# Patient Record
Sex: MALE | Race: WHITE | URBAN - METROPOLITAN AREA
[De-identification: names, ages, dates, MRNs, and addresses within clinical notes are randomized per-mention and may not be internally consistent; named-entity substitution may affect disease eponyms.]

---

## 2017-05-09 ENCOUNTER — HOSPITAL ENCOUNTER (OUTPATIENT)
Dept: SURGERY | Age: 72
Discharge: HOME | End: 2017-05-09
Attending: INTERNAL MEDICINE | Admitting: INTERNAL MEDICINE

## 2017-05-09 VITALS
HEART RATE: 68 BPM | HEIGHT: 66 IN | SYSTOLIC BLOOD PRESSURE: 110 MMHG | BODY MASS INDEX: 25.07 KG/M2 | DIASTOLIC BLOOD PRESSURE: 68 MMHG | OXYGEN SATURATION: 95 % | TEMPERATURE: 97.2 F | WEIGHT: 156 LBS | RESPIRATION RATE: 16 BRPM

## 2017-05-09 DIAGNOSIS — D50.9 IRON DEFICIENCY ANEMIA: ICD-10-CM

## 2017-05-09 RX ORDER — LIDOCAINE HYDROCHLORIDE 10 MG/ML
0.1 INJECTION, SOLUTION EPIDURAL; INFILTRATION; INTRACAUDAL; PERINEURAL ONCE
Status: DISCONTINUED | OUTPATIENT
Start: 2017-05-09 | End: 2017-05-10 | Stop reason: HOSPADM

## 2017-05-09 RX ORDER — SODIUM CHLORIDE, SODIUM LACTATE, POTASSIUM CHLORIDE, CALCIUM CHLORIDE 600; 310; 30; 20 MG/100ML; MG/100ML; MG/100ML; MG/100ML
INJECTION, SOLUTION INTRAVENOUS CONTINUOUS
Status: DISCONTINUED | OUTPATIENT
Start: 2017-05-09 | End: 2017-05-10 | Stop reason: HOSPADM

## 2017-05-09 RX ADMIN — SODIUM CHLORIDE, SODIUM LACTATE, POTASSIUM CHLORIDE, CALCIUM CHLORIDE: 600; 310; 30; 20 INJECTION, SOLUTION INTRAVENOUS at 11:49

## 2017-05-09 ASSESSMENT — PAIN SCALES - GENERAL
PAINLEVEL_OUTOF10: 0

## 2017-05-09 ASSESSMENT — PAIN - FUNCTIONAL ASSESSMENT: PAIN_FUNCTIONAL_ASSESSMENT: 0-10

## 2022-06-29 LAB
FERRITIN: 20.6 NG/ML (ref 30–400)
FOLATE: 9.72 NG/ML (ref 4.78–24.2)
IRON SATURATION: 26 % (ref 20–50)
IRON: 87 UG/DL (ref 59–158)
TOTAL IRON BINDING CAPACITY: 339 UG/DL (ref 260–445)
TRANSFERRIN: 287 MG/DL (ref 200–360)

## 2022-12-01 RX ORDER — PANTOPRAZOLE SODIUM 40 MG/1
40 GRANULE, DELAYED RELEASE ORAL
COMMUNITY

## 2022-12-01 RX ORDER — AMLODIPINE BESYLATE 10 MG/1
10 TABLET ORAL DAILY
COMMUNITY

## 2022-12-01 RX ORDER — LOSARTAN POTASSIUM 100 MG/1
100 TABLET ORAL DAILY
COMMUNITY

## 2022-12-01 NOTE — PROGRESS NOTES
[facility] ENDOSCOPY COLONOSCOPY PRE-OPERATIVE INSTRUCTIONS    Procedure date__12/8/2022_______Arrival time__1130__________        Surgery time__1230__________       Clear liquids the day before the procedure. Do not eat or drink anything within 5 hours of your procedure. This includes water chewing gum, mints and ice chips. You may brush your teeth and gargle the morning of your surgery, but do not swallow the water    You may be asked to stop blood thinners such as Coumadin, Plavix, Fragmin, Lovenox, etc., or any anti-inflammatories such as:  Aspirin, Ibuprofen, Advil, Naproxen prior to your procedure. We also ask that you stop any OTC medications such as fish oil, vitamin E, glucosamine, garlic, Multivitamins, COQ 10, etc.    You must make arrangements for a responsible adult to arrive with you and stay in our waiting area during your procedure. They will also need to take you home after your procedure. For your safety you will not be allowed to leave alone or drive yourself home. Also for your safety, it is strongly suggested that someone stay with you the first 24 hours after your procedure. For your comfort, please wear simple loose fitting clothing to the center. Please do not bring valuables. If you have a living will and a durable power of  for healthcare, please bring in a copy.      You will need to bring a photo ID and insurance card    Our goal is to provide you with excellent care so if you have any questions, please contact us at the [facility] at [phone]         Please note these are generalized instructions for all colonoscopy cases, you may be provided with more specific instructions if necessary

## 2022-12-07 ENCOUNTER — ANESTHESIA EVENT (OUTPATIENT)
Dept: ENDOSCOPY | Age: 77
End: 2022-12-07
Payer: MEDICARE

## 2022-12-08 ENCOUNTER — ANESTHESIA (OUTPATIENT)
Dept: ENDOSCOPY | Age: 77
End: 2022-12-08
Payer: MEDICARE

## 2022-12-08 ENCOUNTER — HOSPITAL ENCOUNTER (OUTPATIENT)
Age: 77
Setting detail: OUTPATIENT SURGERY
Discharge: HOME | End: 2022-12-08
Attending: INTERNAL MEDICINE | Admitting: INTERNAL MEDICINE
Payer: MEDICARE

## 2022-12-08 VITALS
TEMPERATURE: 96.9 F | DIASTOLIC BLOOD PRESSURE: 77 MMHG | OXYGEN SATURATION: 99 % | BODY MASS INDEX: 24.2 KG/M2 | HEART RATE: 73 BPM | HEIGHT: 66 IN | SYSTOLIC BLOOD PRESSURE: 105 MMHG | RESPIRATION RATE: 16 BRPM | WEIGHT: 150.6 LBS

## 2022-12-08 DIAGNOSIS — K50.90 CROHN'S DISEASE WITHOUT COMPLICATION, UNSPECIFIED GASTROINTESTINAL TRACT LOCATION (HCC): ICD-10-CM

## 2022-12-08 PROCEDURE — 2500000003 HC RX 250 WO HCPCS

## 2022-12-08 PROCEDURE — 3609010600 HC COLONOSCOPY POLYPECTOMY SNARE/COLD BIOPSY: Performed by: INTERNAL MEDICINE

## 2022-12-08 PROCEDURE — 2580000003 HC RX 258: Performed by: ANESTHESIOLOGY

## 2022-12-08 PROCEDURE — 6360000002 HC RX W HCPCS

## 2022-12-08 PROCEDURE — 88305 TISSUE EXAM BY PATHOLOGIST: CPT

## 2022-12-08 PROCEDURE — 7100000010 HC PHASE II RECOVERY - FIRST 15 MIN: Performed by: INTERNAL MEDICINE

## 2022-12-08 PROCEDURE — 3700000000 HC ANESTHESIA ATTENDED CARE: Performed by: INTERNAL MEDICINE

## 2022-12-08 PROCEDURE — 7100000011 HC PHASE II RECOVERY - ADDTL 15 MIN: Performed by: INTERNAL MEDICINE

## 2022-12-08 PROCEDURE — 3700000001 HC ADD 15 MINUTES (ANESTHESIA): Performed by: INTERNAL MEDICINE

## 2022-12-08 PROCEDURE — 2709999900 HC NON-CHARGEABLE SUPPLY: Performed by: INTERNAL MEDICINE

## 2022-12-08 RX ORDER — SODIUM CHLORIDE 9 MG/ML
INJECTION, SOLUTION INTRAVENOUS PRN
Status: CANCELLED | OUTPATIENT
Start: 2022-12-08

## 2022-12-08 RX ORDER — DIPHENHYDRAMINE HYDROCHLORIDE 50 MG/ML
12.5 INJECTION INTRAMUSCULAR; INTRAVENOUS
Status: CANCELLED | OUTPATIENT
Start: 2022-12-08 | End: 2022-12-09

## 2022-12-08 RX ORDER — PROPOFOL 10 MG/ML
INJECTION, EMULSION INTRAVENOUS PRN
Status: DISCONTINUED | OUTPATIENT
Start: 2022-12-08 | End: 2022-12-08 | Stop reason: SDUPTHER

## 2022-12-08 RX ORDER — SODIUM CHLORIDE 0.9 % (FLUSH) 0.9 %
5-40 SYRINGE (ML) INJECTION EVERY 12 HOURS SCHEDULED
Status: DISCONTINUED | OUTPATIENT
Start: 2022-12-08 | End: 2022-12-08 | Stop reason: HOSPADM

## 2022-12-08 RX ORDER — SODIUM CHLORIDE 9 MG/ML
INJECTION, SOLUTION INTRAVENOUS PRN
Status: DISCONTINUED | OUTPATIENT
Start: 2022-12-08 | End: 2022-12-08 | Stop reason: HOSPADM

## 2022-12-08 RX ORDER — SODIUM CHLORIDE 0.9 % (FLUSH) 0.9 %
5-40 SYRINGE (ML) INJECTION PRN
Status: DISCONTINUED | OUTPATIENT
Start: 2022-12-08 | End: 2022-12-08 | Stop reason: HOSPADM

## 2022-12-08 RX ORDER — LIDOCAINE HYDROCHLORIDE 20 MG/ML
INJECTION, SOLUTION EPIDURAL; INFILTRATION; INTRACAUDAL; PERINEURAL PRN
Status: DISCONTINUED | OUTPATIENT
Start: 2022-12-08 | End: 2022-12-08 | Stop reason: SDUPTHER

## 2022-12-08 RX ORDER — SODIUM CHLORIDE 0.9 % (FLUSH) 0.9 %
5-40 SYRINGE (ML) INJECTION PRN
Status: CANCELLED | OUTPATIENT
Start: 2022-12-08

## 2022-12-08 RX ORDER — SODIUM CHLORIDE 0.9 % (FLUSH) 0.9 %
5-40 SYRINGE (ML) INJECTION EVERY 12 HOURS SCHEDULED
Status: CANCELLED | OUTPATIENT
Start: 2022-12-08

## 2022-12-08 RX ORDER — PROPOFOL 10 MG/ML
INJECTION, EMULSION INTRAVENOUS CONTINUOUS PRN
Status: DISCONTINUED | OUTPATIENT
Start: 2022-12-08 | End: 2022-12-08 | Stop reason: SDUPTHER

## 2022-12-08 RX ORDER — ONDANSETRON 2 MG/ML
4 INJECTION INTRAMUSCULAR; INTRAVENOUS
Status: CANCELLED | OUTPATIENT
Start: 2022-12-08 | End: 2022-12-09

## 2022-12-08 RX ADMIN — PROPOFOL 160 MCG/KG/MIN: 10 INJECTION, EMULSION INTRAVENOUS at 12:47

## 2022-12-08 RX ADMIN — SODIUM CHLORIDE: 9 INJECTION, SOLUTION INTRAVENOUS at 12:04

## 2022-12-08 RX ADMIN — PROPOFOL 80 MG: 10 INJECTION, EMULSION INTRAVENOUS at 12:47

## 2022-12-08 RX ADMIN — LIDOCAINE HYDROCHLORIDE 60 MG: 20 INJECTION, SOLUTION EPIDURAL; INFILTRATION; INTRACAUDAL; PERINEURAL at 12:47

## 2022-12-08 ASSESSMENT — PAIN SCALES - GENERAL
PAINLEVEL_OUTOF10: 0
PAINLEVEL_OUTOF10: 0

## 2022-12-08 ASSESSMENT — ENCOUNTER SYMPTOMS: SHORTNESS OF BREATH: 0

## 2022-12-08 ASSESSMENT — PAIN - FUNCTIONAL ASSESSMENT: PAIN_FUNCTIONAL_ASSESSMENT: NONE - DENIES PAIN

## 2022-12-08 ASSESSMENT — LIFESTYLE VARIABLES: SMOKING_STATUS: 0

## 2022-12-08 NOTE — DISCHARGE INSTRUCTIONS

## 2022-12-08 NOTE — ANESTHESIA PRE PROCEDURE
Department of Anesthesiology  Preprocedure Note       Name:  Morteza Cooper   Age:  77 y.o.  :  1945                                          MRN:  [id number]         Date:  2022      Surgeon: Lucinda Kim):  Thao Andrews MD    Procedure: Procedure(s):  COLONOSCOPY DIAGNOSTIC    Medications prior to admission:   Prior to Admission medications    Medication Sig Start Date End Date Taking? Authorizing Provider   losartan (COZAAR) 100 MG tablet Take 100 mg by mouth daily   Yes Historical Provider, MD   amLODIPine (NORVASC) 10 MG tablet Take 10 mg by mouth daily   Yes Historical Provider, MD   CHLORTHALIDONE PO Take 12.5 mg by mouth   Yes Historical Provider, MD   pantoprazole sodium (PROTONIX) 40 MG PACK packet Take 40 mg by mouth every morning (before breakfast)   Yes Historical Provider, MD   fluticasone (FLONASE) 50 MCG/ACT nasal spray 1 spray by Nasal route daily. Historical Provider, MD   tamsulosin (FLOMAX) 0.4 MG capsule Take 0.4 mg by mouth 2 times daily. Historical Provider, MD   finasteride (PROSCAR) 5 MG tablet Take 5 mg by mouth daily. Historical Provider, MD       Current medications:    Current Facility-Administered Medications   Medication Dose Route Frequency Provider Last Rate Last Admin    sodium chloride flush 0.9 % injection 5-40 mL  5-40 mL IntraVENous 2 times per day Cait Rowe MD        sodium chloride flush 0.9 % injection 5-40 mL  5-40 mL IntraVENous PRN Cait Rowe MD        0.9 % sodium chloride infusion   IntraVENous PRN Cait Rowe MD 50 mL/hr at 22 1204 New Bag at 22 1204       Allergies:     Allergies   Allergen Reactions    Niacin And Related        Problem List:    Patient Active Problem List   Diagnosis Code    Hydrocele N43.3    Low HDL (under 40) E78.6    Nephrolithiasis N20.0    Duodenal ulcer with hemorrhage K26.4    Hypertension I10    BPH (benign prostatic hyperplasia) N40.0    Crohn's disease ([facility] [address] K50.90    Vitamin B 12 deficiency E53.8    Skin tag L91.8       Past Medical History:        Diagnosis Date    Blood transfusion 1968    for bleeding ulcer    BPH (benign prostatic hyperplasia) 2000    Calculus of kidney 2008    Chronic or unspecified duodenal ulcer with hemorrhage, without mention of obstruction 1969    History of lithotripsy     Hydrocele     Hypertension 2000    Low HDL (under 40)     Nephrolithiasis     Regional enteritis of small intestine ([address] 1966    AKA Crohn's Disease    Vitamin D deficiency        Past Surgical History:        Procedure Laterality Date    COLONOSCOPY  03/19/1999    Stenotic ulcerated anastomosis    COLONOSCOPY  12/21/2012    TVA of anastomosis    COLONOSCOPY  12/17/2002    Stenotic ulcerated anastomosis    COLONOSCOPY  12/27/2007    No active inflammation    COLONOSCOPY  05/09/2017    GASTRECTOMY  1969    Partial    SMALL INTESTINE SURGERY  1966    Resection TI    SMALL INTESTINE SURGERY      UPPER GASTROINTESTINAL ENDOSCOPY  1980's       Social History:    Social History     Tobacco Use    Smoking status: Never    Smokeless tobacco: Never   Substance Use Topics    Alcohol use: Yes     Comment: beer once week- especially if the [employer] win!                                 Counseling given: Not Answered      Vital Signs (Current):   Vitals:    12/01/22 1532 12/08/22 1201   BP:  135/80   Pulse:  73   Resp:  14   Temp:  (!) 96.7 °F (35.9 °C)   TempSrc:  Temporal   SpO2:  96%   Weight: 155 lb (70.3 kg) 150 lb 9.6 oz (68.3 kg)   Height: 5' 6\" (1.676 m) 5' 6\" (1.676 m)                                              BP Readings from Last 3 Encounters:   12/08/22 135/80   05/09/17 110/68   03/28/16 118/80       NPO Status: Time of last liquid consumption: 0600                        Time of last solid consumption: 1900                        Date of last liquid consumption: 12/08/22                        Date of last solid food consumption: 12/06/22    BMI:   Wt Readings from Last 3 Encounters:   12/08/22 150 lb 9.6 oz (68.3 kg)   05/09/17 156 lb (70.8 kg)   03/28/16 160 lb (72.6 kg)     Body mass index is 24.31 kg/m². CBC:   Lab Results   Component Value Date/Time    WBC 4.2 08/27/2021 03:02 PM    RBC 4.36 08/27/2021 03:02 PM    HGB 12.6 08/27/2021 03:02 PM    HCT 36.9 08/27/2021 03:02 PM    MCV 84.8 08/27/2021 03:02 PM    RDW 17.2 08/27/2021 03:02 PM     08/27/2021 03:02 PM       CMP:   Lab Results   Component Value Date/Time     01/29/2016 08:42 AM    K 4.5 01/29/2016 08:42 AM     01/29/2016 08:42 AM    CO2 24 01/29/2016 08:42 AM    BUN 22 01/29/2016 08:42 AM    CREATININE 1.1 01/29/2016 08:42 AM    GFRAA >60 01/29/2016 08:42 AM    GFRAA >60 03/07/2013 09:43 AM    AGRATIO 1.3 01/29/2016 08:42 AM    LABGLOM >60 01/29/2016 08:42 AM    GLUCOSE 94 01/29/2016 08:42 AM    PROT 6.9 01/29/2016 08:42 AM    PROT 7.6 03/07/2013 09:43 AM    CALCIUM 9.7 01/29/2016 08:42 AM    BILITOT 0.6 01/29/2016 08:42 AM    ALKPHOS 67 01/29/2016 08:42 AM    AST 31 01/29/2016 08:42 AM    ALT 19 01/29/2016 08:42 AM       POC Tests: No results for input(s): POCGLU, POCNA, POCK, POCCL, POCBUN, POCHEMO, POCHCT in the last 72 hours.     Coags: No results found for: PROTIME, INR, APTT    HCG (If Applicable): No results found for: PREGTESTUR, PREGSERUM, HCG, HCGQUANT     ABGs: No results found for: PHART, PO2ART, FII9AGI, BJH6NWA, BEART, Z6LQLDJP     Type & Screen (If Applicable):  No results found for: LABABO, LABRH    Drug/Infectious Status (If Applicable):  No results found for: HIV, HEPCAB    COVID-19 Screening (If Applicable): No results found for: COVID19        Anesthesia Evaluation  Patient summary reviewed no history of anesthetic complications:   Airway: Mallampati: II  TM distance: >3 FB   Neck ROM: full  Mouth opening: > = 3 FB   Dental: normal exam         Pulmonary:Negative Pulmonary ROS       (-) shortness of breath and not a current smoker          Patient did not smoke on day of surgery. Cardiovascular:    (+) hypertension:,     (-) pacemaker, past MI, CABG/stent and  angina       Beta Blocker:  Not on Beta Blocker         Neuro/Psych:   Negative Neuro/Psych ROS     (-) seizures and CVA           GI/Hepatic/Renal:        (-) liver disease and no renal disease      ROS comment: Crohn's. Endo/Other: Negative Endo/Other ROS       (-) diabetes mellitus, hypothyroidism, hyperthyroidism               Abdominal:             Vascular: negative vascular ROS. Other Findings:           Anesthesia Plan      MAC     ASA 2       Induction: intravenous. Anesthetic plan and risks discussed with patient. Plan discussed with CRNA. This pre-anesthesia assessment may be used as a history and physical.    DOS STAFF ADDENDUM:    Pt seen and examined, chart reviewed (including anesthesia, drug and allergy history). No interval changes to history and physical examination. Anesthetic plan, risks, benefits, alternatives, and personnel involved discussed with patient. Patient verbalized an understanding and agrees to proceed.       Deshaun Masterson MD  December 8, 2022  12:14 PM

## 2022-12-08 NOTE — OP NOTE
Colonoscopy Procedure Note      Patient: Bernice Omalley  : 1945  Acct#:     Procedure: Colonoscopy with biopsy    Date:  2022    Surgeon:  Deidra Perales MD, MD    Referring Physician:  Lauren Negrete    Previous Colonoscopy: YES  Date:    Greater than 3 years: YES    Preoperative Diagnosis: Crohn's disease, history of ileocolectomy    Postoperative Diagnosis: Evidence of inflammation of the IC valve, hemorrhoids    Consent:  The patient or their legal guardian has signed a consent, and is aware of the potential risks, benefits, alternatives, and potential complications of this procedure. These include, but are not limited to hemorrhage, bleeding, post procedural pain, perforation, phlebitis, aspiration, hypotension, hypoxia, cardiovascular events such as arryhthmia, and possibly death. Additionally, the possibility of missed colonic polyps and interval colon cancer was discussed in the consent. Anesthesia: MAC, see anesthesia documentation    Procedure: An informed consent was obtained from the patient after explanation of indications, benefits, possible risks and complications of the procedure. The patient was then taken to the endoscopy suite, placed in the left lateral decubitus position, and the above IV anesthesia was administered. A digital rectal examination was performed and revealed external hemorrhoids noted. The Olympus video colonoscope was placed in the patient's rectum under digital direction and advanced to the cecum. The cecum was identified by characteristic anatomy and ballottment. The preparation was good. The ileocecal valve was identified. Erythema and edema at the ileocolic anastomosis, biopsied (jar A). The pediatric colonoscope was not able to pass into the terminal ileum due to stenosis. The scope was then withdrawn back through the cecum, ascending, transverse, descending, sigmoid colon, and rectum.   Careful circumferential examination of the mucosa in these areas demonstrated:    Unremarkable exam of the colon. Biopsies taken from the right colon (jar B) and left colon (jar D). A 2 mm Polyp in the Descending Colon Removed with Cold Biopsy Forceps and Sent to Pathology (Jar C). The scope was then withdrawn into the rectum and retroflexed. The retroflexed view of the anal verge and rectum demonstrates internal hemorrhoids. The scope was straightened, the colon was decompressed and the scope was withdrawn from the patient. The patient tolerated the procedure well and was taken to the PACU in good condition. Estimated Blood Loss (mL): *Minimal  Complications: None    Specimens:   ID Type Source Tests Collected by Time Destination   A : A. Bx's terminal ileum valve Tissue Colon SURGICAL PATHOLOGY Norm MD Susie 12/8/2022 1256    B : B. Bx's right colon Tissue Colon SURGICAL PATHOLOGY Dustin Richards MD 12/8/2022 1311    C : C. Bx's ascending colon polyp Tissue Colon SURGICAL PATHOLOGY Norm MD Susie 12/8/2022 1320    D : D. Bx's left colon Tissue Colon SURGICAL PATHOLOGY Dustin Richards MD 12/8/2022 1322        Impression:    Evidence of inflammation of the IC valve, hemorrhoids    Recommendations:    1. Call for results in 2 weeks  2.   Repeat colonoscopy in 5 years due to history of tubulovillous adenoma based on her overall health condition at that time    Dustin Richards MD, MD  [address]  12/8/2022  [phone]

## 2022-12-08 NOTE — H&P
Gastroenterology History and Physical         Patient: Kishore Polanco    MRN: [id number]    Sex: male    YOB: 1945    Age: 77 y.o. Location: [address] [place] [address] [facility]         Date:12/8/2022    Primary Care Physician: Charly Smith             Patient: Kishore Polanco         Physician: Ilene Velez MD, MD    Crohn's disease without complication         Vital Signs: /80   Pulse 73   Temp (!) 96.7 °F (35.9 °C) (Temporal)   Resp 14   Ht 5' 6\" (1.676 m)   Wt 150 lb 9.6 oz (68.3 kg)   SpO2 96%   BMI 24.31 kg/m²          Allergies: Allergies   Allergen Reactions    Niacin And Related             History of Present Illness: 77 y.o. male presents for colonoscopy for Crohns disease.      History:    Past Medical History:   Diagnosis Date    Blood transfusion 1968    for bleeding ulcer    BPH (benign prostatic hyperplasia) 2000    Calculus of kidney 2008    Chronic or unspecified duodenal ulcer with hemorrhage, without mention of obstruction 1969    History of lithotripsy     Hydrocele     Hypertension 2000    Low HDL (under 40)     Nephrolithiasis     Regional enteritis of small intestine ([address] 1966    AKA Crohn's Disease    Vitamin D deficiency        Past Surgical History:   Procedure Laterality Date    COLONOSCOPY  03/19/1999    Stenotic ulcerated anastomosis    COLONOSCOPY  12/21/2012    TVA of anastomosis    COLONOSCOPY  12/17/2002    Stenotic ulcerated anastomosis    COLONOSCOPY  12/27/2007    No active inflammation    COLONOSCOPY  05/09/2017    GASTRECTOMY  1969    Partial    SMALL INTESTINE SURGERY  1966    Resection TI    SMALL INTESTINE SURGERY      UPPER GASTROINTESTINAL ENDOSCOPY  1980's       Social History     Socioeconomic History    Marital status:      Spouse name: None    Number of children: None    Years of education: None    Highest education level: None   Tobacco Use    Smoking status: Never    Smokeless tobacco: Never   Substance and Sexual Activity Alcohol use: Yes     Comment: beer once week- especially if the [employer] win! Drug use: No       Family History   Problem Relation Age of Onset    Stroke Mother     Stroke Father     Crohn's Disease Other         2nd cousin            Medications:   Prior to Admission medications    Medication Sig Start Date End Date Taking? Authorizing Provider   losartan (COZAAR) 100 MG tablet Take 100 mg by mouth daily   Yes Historical Provider, MD   amLODIPine (NORVASC) 10 MG tablet Take 10 mg by mouth daily   Yes Historical Provider, MD   CHLORTHALIDONE PO Take 12.5 mg by mouth   Yes Historical Provider, MD   pantoprazole sodium (PROTONIX) 40 MG PACK packet Take 40 mg by mouth every morning (before breakfast)   Yes Historical Provider, MD   fluticasone (FLONASE) 50 MCG/ACT nasal spray 1 spray by Nasal route daily. Historical Provider, MD   tamsulosin (FLOMAX) 0.4 MG capsule Take 0.4 mg by mouth 2 times daily. Historical Provider, MD   finasteride (PROSCAR) 5 MG tablet Take 5 mg by mouth daily. Historical Provider, MD       ROS: the patient denies stiff neck, double vision, ataxia,  chest pain, orthopnea, sob, wheezing, productive cough    Physical Exam: I personally examined the patient.     Heart: within normal limits    Lungs: no respiratory distress    Mental status:  Within normal limits    Mallampati Score: II     Planned Procedure: Colonoscopy         Signed By:    Areli Mejias MD, MD         December 8, 2022

## 2022-12-08 NOTE — ANESTHESIA POSTPROCEDURE EVALUATION
Department of Anesthesiology  Postprocedure Note    Patient: Gerry Cuevas  MRN: [id number]  YOB: 1945  Date of evaluation: 12/8/2022      Procedure Summary     Date: 12/08/22 Room / Location: [address]    Anesthesia Start: 1243 Anesthesia Stop: 1331    Procedure: COLONOSCOPY POLYPECTOMY SNARE/COLD BIOPSY Diagnosis:       Crohn's disease without complication, unspecified [facility] tract [facility]      (Crohn's disease without complication)    Surgeons: Mayra Madrid MD Responsible Provider: Cherie Conte MD    Anesthesia Type: MAC ASA Status: 2          Anesthesia Type: No value filed.     Shin Phase I: Shin Score: 10    Shin Phase II: Shin Score: 5      Anesthesia Post Evaluation    Patient location during evaluation: bedside  Patient participation: complete - patient participated  Level of consciousness: awake and alert  Pain score: 0  Nausea & Vomiting: no nausea  Complications: no  Cardiovascular status: hemodynamically stable  Respiratory status: acceptable  Hydration status: stable

## 2023-09-15 LAB
BASOPHILS # BLD: 0 K/UL (ref 0–0.2)
BASOPHILS NFR BLD: 0.7 %
DEPRECATED RDW RBC AUTO: 15.2 % (ref 12.4–15.4)
EOSINOPHIL # BLD: 0.3 K/UL (ref 0–0.6)
EOSINOPHIL NFR BLD: 5.1 %
FERRITIN SERPL IA-MCNC: 19.4 NG/ML (ref 30–400)
FOLATE SERPL-MCNC: 7.47 NG/ML (ref 4.78–24.2)
HCT VFR BLD AUTO: 38.5 % (ref 40.5–52.5)
HGB BLD-MCNC: 12.8 G/DL (ref 13.5–17.5)
LYMPHOCYTES # BLD: 1.6 K/UL (ref 1–5.1)
LYMPHOCYTES NFR BLD: 25.2 %
MCH RBC QN AUTO: 29.4 PG (ref 26–34)
MCHC RBC AUTO-ENTMCNC: 33.4 G/DL (ref 31–36)
MCV RBC AUTO: 87.9 FL (ref 80–100)
MONOCYTES # BLD: 0.4 K/UL (ref 0–1.3)
MONOCYTES NFR BLD: 7.1 %
NEUTROPHILS # BLD: 3.9 K/UL (ref 1.7–7.7)
NEUTROPHILS NFR BLD: 61.9 %
PLATELET # BLD AUTO: 222 K/UL (ref 135–450)
PMV BLD AUTO: 8.4 FL (ref 5–10.5)
RBC # BLD AUTO: 4.38 M/UL (ref 4.2–5.9)
WBC # BLD AUTO: 6.3 K/UL (ref 4–11)

## 2023-09-16 LAB
CRP SERPL-MCNC: <3 MG/L (ref 0–5.1)
IRON SATN MFR SERPL: 23 % (ref 20–50)
IRON SERPL-MCNC: 76 UG/DL (ref 59–158)
TIBC SERPL-MCNC: 330 UG/DL (ref 260–445)

## 2024-08-14 LAB
ALBUMIN SERPL-MCNC: 4.4 G/DL (ref 3.4–5)
ALP SERPL-CCNC: 80 U/L (ref 40–129)
ALT SERPL-CCNC: 17 U/L (ref 10–40)
AST SERPL-CCNC: 20 U/L (ref 15–37)
BILIRUB DIRECT SERPL-MCNC: 0.5 MG/DL (ref 0–0.3)
BILIRUB INDIRECT SERPL-MCNC: 0.7 MG/DL (ref 0–1)
BILIRUB SERPL-MCNC: 1.2 MG/DL (ref 0–1)
FERRITIN SERPL IA-MCNC: 15.8 NG/ML (ref 30–400)
FOLATE SERPL-MCNC: 13 NG/ML (ref 4.78–24.2)
IRON SATN MFR SERPL: 21 % (ref 20–50)
IRON SERPL-MCNC: 77 UG/DL (ref 59–158)
PROT SERPL-MCNC: 6.8 G/DL (ref 6.4–8.2)
TIBC SERPL-MCNC: 367 UG/DL (ref 260–445)

## 2025-07-23 LAB
FERRITIN SERPL IA-MCNC: 25.2 NG/ML (ref 30–400)
FOLATE SERPL-MCNC: 10.3 NG/ML (ref 4.78–24.2)
IRON SATN MFR SERPL: 16 % (ref 20–50)
IRON SERPL-MCNC: 54 UG/DL (ref 59–158)
TIBC SERPL-MCNC: 344 UG/DL (ref 260–445)

## (undated) DEVICE — FORCEPS BX 240CM 2.4MM L NDL RAD JAW 4 M00513334